# Patient Record
Sex: MALE | Race: WHITE | NOT HISPANIC OR LATINO | URBAN - METROPOLITAN AREA
[De-identification: names, ages, dates, MRNs, and addresses within clinical notes are randomized per-mention and may not be internally consistent; named-entity substitution may affect disease eponyms.]

---

## 2018-09-20 ENCOUNTER — OFFICE (OUTPATIENT)
Dept: URBAN - METROPOLITAN AREA CLINIC 101 | Facility: CLINIC | Age: 53
End: 2018-09-20

## 2018-09-20 PROCEDURE — 00031: CPT

## 2018-10-09 ENCOUNTER — ON CAMPUS - OUTPATIENT (OUTPATIENT)
Dept: URBAN - METROPOLITAN AREA HOSPITAL 35 | Facility: HOSPITAL | Age: 53
End: 2018-10-09
Payer: COMMERCIAL

## 2018-10-09 DIAGNOSIS — K63.5 POLYP OF COLON: ICD-10-CM

## 2018-10-09 DIAGNOSIS — Z12.11 ENCOUNTER FOR SCREENING FOR MALIGNANT NEOPLASM OF COLON: ICD-10-CM

## 2018-10-09 PROCEDURE — 45380 COLONOSCOPY AND BIOPSY: CPT | Mod: PT

## 2021-09-23 ENCOUNTER — OFFICE (OUTPATIENT)
Dept: URBAN - METROPOLITAN AREA CLINIC 102 | Facility: CLINIC | Age: 56
End: 2021-09-23

## 2021-09-23 VITALS
HEART RATE: 70 BPM | SYSTOLIC BLOOD PRESSURE: 129 MMHG | TEMPERATURE: 98.8 F | WEIGHT: 195 LBS | HEIGHT: 68 IN | DIASTOLIC BLOOD PRESSURE: 80 MMHG

## 2021-09-23 DIAGNOSIS — R53.83 OTHER FATIGUE: ICD-10-CM

## 2021-09-23 DIAGNOSIS — R10.84 GENERALIZED ABDOMINAL PAIN: ICD-10-CM

## 2021-09-23 DIAGNOSIS — R11.0 NAUSEA: ICD-10-CM

## 2021-09-23 PROCEDURE — 99204 OFFICE O/P NEW MOD 45 MIN: CPT | Performed by: NURSE PRACTITIONER

## 2021-09-23 RX ORDER — PANTOPRAZOLE SODIUM 40 MG/1
TABLET, DELAYED RELEASE ORAL
Qty: 30 | Refills: 5 | Status: ACTIVE
Start: 2021-09-23

## 2021-09-23 NOTE — SERVICEHPINOTES
LIZABETH LUNA   is a   55   male who presents with stomach pain. About 2 weeks ago, ate at Mexican restaurant and had some ice cream. Developed nausea and vomiting the next day.  COVID 19 was tested and it was negative. CT of abdomen and pelvis was unremarkable. Has been under a lot of stress. 
br
The pain is generalized. + Fatigue. On and off nausea with no further vomiting. 
br Urgent care gave him reglan and dicyclomine. 
br + Belching. Lost about 10 lbs within 2 weeks. Has not been eating a lot due to decreased appetite. 
brMoves bowel daily. More prone to be constipated due to pain medication. Denies blood in stool, melena or diarrhea. Had diarrhea in the beginning of the stomach pain which has resolved in 2 days. 
br Denies taking NSAIDs. br

br Last colonoscopy was done in 2018 and it was unremarkable. 
br
brainDenies personal hx of cardivascular disease. br
brNot sure if father had a colon cancer. 
br

## 2021-09-28 ENCOUNTER — OFFICE (OUTPATIENT)
Dept: URBAN - METROPOLITAN AREA CLINIC 98 | Facility: CLINIC | Age: 56
End: 2021-09-28

## 2021-09-28 VITALS
HEART RATE: 68 BPM | TEMPERATURE: 97.6 F | DIASTOLIC BLOOD PRESSURE: 62 MMHG | TEMPERATURE: 97.7 F | OXYGEN SATURATION: 96 % | DIASTOLIC BLOOD PRESSURE: 81 MMHG | SYSTOLIC BLOOD PRESSURE: 112 MMHG | HEART RATE: 56 BPM | WEIGHT: 195 LBS | RESPIRATION RATE: 17 BRPM | DIASTOLIC BLOOD PRESSURE: 682 MMHG | HEIGHT: 68 IN | OXYGEN SATURATION: 90 % | OXYGEN SATURATION: 98 % | HEART RATE: 82 BPM | SYSTOLIC BLOOD PRESSURE: 103 MMHG | RESPIRATION RATE: 16 BRPM | SYSTOLIC BLOOD PRESSURE: 114 MMHG | HEART RATE: 76 BPM | SYSTOLIC BLOOD PRESSURE: 117 MMHG | OXYGEN SATURATION: 94 % | RESPIRATION RATE: 11 BRPM | SYSTOLIC BLOOD PRESSURE: 110 MMHG | DIASTOLIC BLOOD PRESSURE: 67 MMHG

## 2021-09-28 DIAGNOSIS — R10.13 EPIGASTRIC PAIN: ICD-10-CM

## 2021-09-28 DIAGNOSIS — R11.0 NAUSEA: ICD-10-CM

## 2022-11-08 ENCOUNTER — OFFICE (OUTPATIENT)
Dept: URBAN - METROPOLITAN AREA CLINIC 102 | Facility: CLINIC | Age: 57
End: 2022-11-08

## 2022-11-08 VITALS
SYSTOLIC BLOOD PRESSURE: 123 MMHG | WEIGHT: 200 LBS | HEART RATE: 58 BPM | HEIGHT: 68 IN | TEMPERATURE: 97.9 F | DIASTOLIC BLOOD PRESSURE: 82 MMHG

## 2022-11-08 DIAGNOSIS — K59.09 OTHER CONSTIPATION: ICD-10-CM

## 2022-11-08 DIAGNOSIS — R10.11 RIGHT UPPER QUADRANT PAIN: ICD-10-CM

## 2022-11-08 DIAGNOSIS — R19.4 CHANGE IN BOWEL HABIT: ICD-10-CM

## 2022-11-08 DIAGNOSIS — F11.20 OPIOID DEPENDENCE, UNCOMPLICATED: ICD-10-CM

## 2022-11-08 PROCEDURE — 99214 OFFICE O/P EST MOD 30 MIN: CPT | Performed by: PHYSICIAN ASSISTANT

## 2022-11-08 RX ORDER — POLYETHYLENE GLYCOL 3350 17 G/17G
POWDER, FOR SOLUTION ORAL
Qty: 1 | Refills: 0 | Status: ACTIVE
Start: 2022-11-08

## 2022-11-08 RX ORDER — HYOSCYAMINE SULFATE 0.12 MG/1
TABLET ORAL; SUBLINGUAL
Qty: 30 | Refills: 3 | Status: ACTIVE
Start: 2022-11-08

## 2022-11-08 NOTE — SERVICEHPINOTES
Mr. Stanley is a 57 YoM with history of arthritis of hip s/p replacement, femoral nerve injury, and chronic pain related to DDD who presents to the office today for complaint of episodic severe abdominal pain, ongoing for about 1 year, significantly worse in the last 2 months. He describes this pain as happening frequently, without particular association with meals, also during the night making it hard to fall asleep. He describes his abdominal pain as diffuse but localizes to the RUQ. He has seen his PCP about this issue multiple times and had abdominal ultrasound(10/21/22) and MRCP(11/02/22) completed. These demonstrated mild dilatation of the CBD(7-9 mm) but no intraluminal stones or obstructing mass was identified. Labs were reportedly done and normal. He is frustrated as his pain has been very severe and disruptive to his sleep and day-to-day activities but there is no diagnosed cause. Noted previous EGD Sept of 2021 wnl. His PCP empirically started him on daily pantoprazole for his abd pain but this has not helped with pain. He does report very poor diet, large consumption of fast food and soda. Increased life-stressors in the last several months. +episodes of feeling hot/sweaty. He reports secondary complaint of acute diarrhea over the weekend which began shortly after eating a hot dog at a local sports event. This seems to be improved in the last 24-48 hours. He typically feels somewhat constipated with daily BMs, but BSS is type 1-2. Also describes small intermittently uncomfortable rectal nodule consistent with external hemorrhoid. Last colonoscopy was 10/2018 with 1 hyperplastic rectal polyp removed, small internal and external hemorrhoids noted.br

## 2022-12-12 ENCOUNTER — OFFICE (OUTPATIENT)
Dept: URBAN - METROPOLITAN AREA CLINIC 102 | Facility: CLINIC | Age: 57
End: 2022-12-12

## 2022-12-12 VITALS
HEIGHT: 68 IN | SYSTOLIC BLOOD PRESSURE: 141 MMHG | HEART RATE: 61 BPM | DIASTOLIC BLOOD PRESSURE: 87 MMHG | WEIGHT: 197 LBS | TEMPERATURE: 98.4 F

## 2022-12-12 DIAGNOSIS — F11.20 OPIOID DEPENDENCE, UNCOMPLICATED: ICD-10-CM

## 2022-12-12 DIAGNOSIS — Z12.11 ENCOUNTER FOR SCREENING FOR MALIGNANT NEOPLASM OF COLON: ICD-10-CM

## 2022-12-12 DIAGNOSIS — K59.09 OTHER CONSTIPATION: ICD-10-CM

## 2022-12-12 DIAGNOSIS — F41.9 ANXIETY DISORDER, UNSPECIFIED: ICD-10-CM

## 2022-12-12 DIAGNOSIS — R10.11 RIGHT UPPER QUADRANT PAIN: ICD-10-CM

## 2022-12-12 PROCEDURE — 99213 OFFICE O/P EST LOW 20 MIN: CPT | Performed by: PHYSICIAN ASSISTANT

## 2022-12-12 NOTE — SERVICEHPINOTES
Mr. Stanley is a 57 YoM with history of chronic pain/arthritis and related opiate dependence, HTN, HLD, who presents for follow up regarding right-sided episodic and sharp abdominal pain sometimes near the level of the umbilicus. He has had extensive GI workup in the last year including EGD, labwork, abdominal ultrasound, MRCP, and CT imaging (x2) of his abdomen. Apart from borderline dilation of the CBD, imaging has been unrevealing, and his labwork has been normal. EGD was normal in 2021, however he began taking a PPI in the last few months due to possible heartburn/reflux (previously noted very sensitive gag reflex) per his PCP and felt like this has helped him some, but not alleviating his pain. At our last visit he was prescribed hyoscyamine and miralax to trial this month. He felt that now that he is taking Miralax, his BMs are much more regular with some associated improvement in general level of discomfort. Hyoscyamine was not helpful and he stopped using this. He has been sleeping better and overall stress/anxiety seems to be improved since last visit. 
br
brLast labs from PCP reviewed (Oct 2022) - Slightly elevated fasting BG, CMP, CBC otherwise wnl. Negative H pylori breath test noted.